# Patient Record
Sex: FEMALE | Race: WHITE | URBAN - METROPOLITAN AREA
[De-identification: names, ages, dates, MRNs, and addresses within clinical notes are randomized per-mention and may not be internally consistent; named-entity substitution may affect disease eponyms.]

---

## 2021-06-16 ENCOUNTER — OFFICE VISIT (OUTPATIENT)
Dept: URBAN - METROPOLITAN AREA CLINIC 91 | Facility: CLINIC | Age: 76
End: 2021-06-16
Payer: MEDICARE

## 2021-06-16 DIAGNOSIS — H43.811 VITREOUS DEGENERATION, RIGHT EYE: ICD-10-CM

## 2021-06-16 PROCEDURE — 99214 OFFICE O/P EST MOD 30 MIN: CPT | Performed by: OPHTHALMOLOGY

## 2021-06-16 PROCEDURE — 92250 FUNDUS PHOTOGRAPHY W/I&R: CPT | Performed by: OPHTHALMOLOGY

## 2021-06-16 ASSESSMENT — INTRAOCULAR PRESSURE
OD: 16
OS: 15

## 2021-06-16 NOTE — IMPRESSION/PLAN
Impression: Age-related nuclear cataract, right eye: H25.11. Plan: Due to the fact that cataract is not affecting patient's vision right eye, I would not recommend surgical intervention at this time. Patient was advised to consider using UVB sunglasses when outdoors. We will consider cataract surgery at later time if patient's visual function no longer meets their needs or interferes with their daily activities.

## 2021-06-16 NOTE — IMPRESSION/PLAN
Impression: Vitreous degeneration, right eye: H43.421. Plan: For posterior vitreous detachment, I have explained signs and symptoms associated with retinal tear or detachment. Patient was advised to return ASAP if they notice an increase or change in floaters, change or decrease in vision or a veil or curtain coming down over their vision. I have explained the risk of vision loss, and possible need for retinal detachment repair in detail. Patient was also provided with an informational pamphlet.

## 2021-09-29 ENCOUNTER — OFFICE VISIT (OUTPATIENT)
Dept: URBAN - METROPOLITAN AREA CLINIC 91 | Facility: CLINIC | Age: 76
End: 2021-09-29
Payer: MEDICARE

## 2021-09-29 DIAGNOSIS — H25.11 AGE-RELATED NUCLEAR CATARACT, RIGHT EYE: ICD-10-CM

## 2021-09-29 PROCEDURE — 99213 OFFICE O/P EST LOW 20 MIN: CPT | Performed by: OPHTHALMOLOGY

## 2021-09-29 PROCEDURE — 92134 CPTRZ OPH DX IMG PST SGM RTA: CPT | Performed by: OPHTHALMOLOGY

## 2021-09-29 RX ORDER — PREDNISOLONE ACETATE 10 MG/ML
1 % SUSPENSION/ DROPS OPHTHALMIC
Qty: 5 | Refills: 1 | Status: ACTIVE
Start: 2021-09-29

## 2021-09-29 RX ORDER — KETOROLAC TROMETHAMINE 5 MG/ML
0.5 % SOLUTION OPHTHALMIC
Qty: 3 | Refills: 1 | Status: ACTIVE
Start: 2021-09-29

## 2021-09-29 RX ORDER — OFLOXACIN 3 MG/ML
0.3 % SOLUTION/ DROPS OPHTHALMIC
Qty: 3 | Refills: 1 | Status: ACTIVE
Start: 2021-09-29

## 2021-09-29 ASSESSMENT — PACHYMETRY
OD: 3.55
OS: 4.61
OD: 24.39
OS: 23.70

## 2021-09-29 ASSESSMENT — VISUAL ACUITY
OS: 20/30
OD: 20/30

## 2021-09-29 ASSESSMENT — INTRAOCULAR PRESSURE
OS: 17
OD: 15

## 2021-09-29 NOTE — IMPRESSION/PLAN
Impression: Age-related nuclear cataract, right eye: H25.11. Plan: Patient has decided to proceed with cataract surgery OD, standard and or lensx discussed as possible options. Due to the fact that the cataract OD is interfering with patient's daily activities, cataract surgery was discussed as an option to improve patient's vision. I have discussed all risks and benefits associated with surgery. Patient understands that the need for cataract surgery is NOT urgent, and that surgery may be delayed if they wish. I discussed the different intra-ocular lens options available including standard monofocal IOL, Crystalens, Toric, ReSTOR Multifocal and Nanoflex blended vision. I have explained the option of patient proceeding with standard cataract surgery vs LenSx and astigmatism management. I stressed possible side effects such as halos and glare, need for glasses, contacts and further surgery such as laser vision correction or IOL exchange. I answered all patient's questions, and addressed any concerns patient may have. Patient wishes to schedule appointment for pre-operative exam at this time.

## 2021-09-29 NOTE — IMPRESSION/PLAN
Impression: Hypertensive retinopathy, bilateral: H35.033. Plan: OCTm reviewed and discussed that AV Chnages are stable and no treatment is required at this time Evidence of hypertensive retinopathy found. The patient was advised to see primary care physician to keep blood pressure under good control. Risk of sudden vision loss associated with hypertension was discussed with patient. If vision suddenly changes, patient should notify our office immediately.

## 2021-10-12 ENCOUNTER — SURGERY (OUTPATIENT)
Dept: URBAN - METROPOLITAN AREA EXTERNAL CLINIC 49 | Facility: EXTERNAL CLINIC | Age: 76
End: 2021-10-12
Payer: MEDICARE

## 2021-10-12 PROCEDURE — 66984 XCAPSL CTRC RMVL W/O ECP: CPT | Performed by: OPHTHALMOLOGY

## 2021-10-12 PROCEDURE — UPG01 UPG01: CUSTOM | Performed by: OPHTHALMOLOGY

## 2021-10-13 ENCOUNTER — POST-OPERATIVE VISIT (OUTPATIENT)
Dept: URBAN - METROPOLITAN AREA CLINIC 91 | Facility: CLINIC | Age: 76
End: 2021-10-13
Payer: MEDICARE

## 2021-10-13 DIAGNOSIS — Z96.1 PRESENCE OF INTRAOCULAR LENS: Primary | ICD-10-CM

## 2021-10-13 PROCEDURE — 99024 POSTOP FOLLOW-UP VISIT: CPT | Performed by: OPHTHALMOLOGY

## 2021-10-13 ASSESSMENT — INTRAOCULAR PRESSURE: OD: 16

## 2021-10-13 NOTE — IMPRESSION/PLAN
Impression: S/P CE/Standard IOL+lensx OD - . Presence of intraocular lens  Z96.1. Excellent post op course   Post operative instructions reviewed - Condition is improving - Cataract OD. Plan: Patient is healing well status post CE IOL OD. I stressed importance of patient avoiding rubbing the eye, staying out of swimming pools, hot tubs and saunas for 10 days. Patient should continue to wear shield at bedtime for 1 week. I explained post-op medication schedule with patient. Patient will continue with Antibiotic 3 times per day for 7 days then discontinue, Steriod 4 times per day x 7 days then decrease to 2 times per day and NSAID 1 times per day x7 days and then discontinue.

## 2021-10-20 ENCOUNTER — POST-OPERATIVE VISIT (OUTPATIENT)
Dept: URBAN - METROPOLITAN AREA CLINIC 91 | Facility: CLINIC | Age: 76
End: 2021-10-20
Payer: MEDICARE

## 2021-10-20 PROCEDURE — 99024 POSTOP FOLLOW-UP VISIT: CPT | Performed by: OPHTHALMOLOGY

## 2021-10-20 ASSESSMENT — VISUAL ACUITY
OS: 20/25
OD: 20/25

## 2021-10-20 ASSESSMENT — INTRAOCULAR PRESSURE: OD: 15

## 2021-10-20 NOTE — IMPRESSION/PLAN
Impression: S/P CE/Standard IOL OD - 8 Days. Presence of intraocular lens  Z96.1. Excellent post op course   Post operative instructions reviewed - Condition is improving - Cataract OD. Plan: Patient is healing well status post CE IOL OD. It is ok for patient to discontinue wearing protective shield at bedtime, and may also resume normal activity at this time. I discussed post-op medication schedule with patient. Patient should continue with steroid 2 times a day x3-4 weeks and then discontinue.

## 2021-11-10 ENCOUNTER — POST-OPERATIVE VISIT (OUTPATIENT)
Dept: URBAN - METROPOLITAN AREA CLINIC 91 | Facility: CLINIC | Age: 76
End: 2021-11-10
Payer: MEDICARE

## 2021-11-10 ASSESSMENT — INTRAOCULAR PRESSURE
OD: 16
OS: 16

## 2021-11-10 ASSESSMENT — VISUAL ACUITY
OD: 20/20
OS: 20/25

## 2021-11-10 NOTE — IMPRESSION/PLAN
Impression: S/P Phaco - PC IOL OD - 28 Days. Presence of intraocular lens  Z96.1. Post operative instructions reviewed - Condition is improving - Plan: Patient is healing well status post CE IOL. Patient will discontinue post operative drops unless directed by physician.

## 2022-02-09 ENCOUNTER — OFFICE VISIT (OUTPATIENT)
Dept: URBAN - METROPOLITAN AREA CLINIC 91 | Facility: CLINIC | Age: 77
End: 2022-02-09
Payer: MEDICARE

## 2022-02-09 DIAGNOSIS — H04.123 DRY EYE SYNDROME OF BILATERAL LACRIMAL GLANDS: ICD-10-CM

## 2022-02-09 DIAGNOSIS — H26.491 OTHER SECONDARY CATARACT, RIGHT EYE: ICD-10-CM

## 2022-02-09 DIAGNOSIS — H35.033 HYPERTENSIVE RETINOPATHY, BILATERAL: Primary | ICD-10-CM

## 2022-02-09 PROCEDURE — 92250 FUNDUS PHOTOGRAPHY W/I&R: CPT | Performed by: OPHTHALMOLOGY

## 2022-02-09 PROCEDURE — 99214 OFFICE O/P EST MOD 30 MIN: CPT | Performed by: OPHTHALMOLOGY

## 2022-02-09 ASSESSMENT — INTRAOCULAR PRESSURE
OD: 15
OS: 15

## 2022-02-09 NOTE — IMPRESSION/PLAN
Impression: Hypertensive retinopathy, bilateral: H35.033. Plan: FFM  reviewed and discussed stable AV changes OU Evidence of hypertensive retinopathy found. The patient was advised to see primary care physician to keep blood pressure under good control. Risk of sudden vision loss associated with hypertension was discussed with patient. If vision suddenly changes, patient should notify our office immediately.

## 2022-10-14 ENCOUNTER — OFFICE VISIT (OUTPATIENT)
Dept: URBAN - METROPOLITAN AREA CLINIC 91 | Facility: CLINIC | Age: 77
End: 2022-10-14
Payer: MEDICARE

## 2022-10-14 DIAGNOSIS — H35.033 HYPERTENSIVE RETINOPATHY, BILATERAL: ICD-10-CM

## 2022-10-14 DIAGNOSIS — H26.491 OTHER SECONDARY CATARACT, RIGHT EYE: ICD-10-CM

## 2022-10-14 DIAGNOSIS — H04.123 DRY EYE SYNDROME OF BILATERAL LACRIMAL GLANDS: ICD-10-CM

## 2022-10-14 DIAGNOSIS — H53.15 VISUAL DISTORTIONS OF SHAPE AND SIZE: Primary | ICD-10-CM

## 2022-10-14 PROCEDURE — 99213 OFFICE O/P EST LOW 20 MIN: CPT | Performed by: OPHTHALMOLOGY

## 2022-10-14 PROCEDURE — 92134 CPTRZ OPH DX IMG PST SGM RTA: CPT | Performed by: OPHTHALMOLOGY

## 2022-10-14 ASSESSMENT — INTRAOCULAR PRESSURE
OS: 15
OD: 15

## 2022-10-14 NOTE — IMPRESSION/PLAN
Impression: Hypertensive retinopathy, bilateral: H35.033. Plan: OCTm shows AV changes and ERM OS. Stable. Evidence of hypertensive retinopathy found. The patient was advised to see primary care physician to keep blood pressure under good control. Risk of sudden vision loss associated with hypertension was discussed with patient. If vision suddenly changes, patient should notify our office immediately.

## 2022-11-18 ENCOUNTER — OFFICE VISIT (OUTPATIENT)
Dept: URBAN - METROPOLITAN AREA CLINIC 91 | Facility: CLINIC | Age: 77
End: 2022-11-18
Payer: MEDICARE

## 2022-11-18 DIAGNOSIS — H43.812 VITREOUS DEGENERATION, LEFT EYE: Primary | ICD-10-CM

## 2022-11-18 PROCEDURE — 99213 OFFICE O/P EST LOW 20 MIN: CPT | Performed by: OPHTHALMOLOGY

## 2022-11-18 NOTE — IMPRESSION/PLAN
Impression: Vitreous degeneration, left eye: H43.371. Plan: For posterior vitreous detachment, I have explained signs and symptoms associated with retinal tear or detachment. Patient was advised to return ASAP if they notice an increase or change in floaters, change or decrease in vision or a veil or curtain coming down over their vision. I have explained the risk of vision loss, and possible need for retinal detachment repair in detail. Patient was also provided with an informational pamphlet.

## 2023-04-11 ENCOUNTER — OFFICE VISIT (OUTPATIENT)
Dept: URBAN - METROPOLITAN AREA CLINIC 91 | Facility: CLINIC | Age: 78
End: 2023-04-11
Payer: MEDICARE

## 2023-04-11 DIAGNOSIS — H10.022 OTHER MUCOPURULENT CONJUNCTIVITIS, LEFT EYE: ICD-10-CM

## 2023-04-11 DIAGNOSIS — H26.491 OTHER SECONDARY CATARACT, RIGHT EYE: Primary | ICD-10-CM

## 2023-04-11 PROCEDURE — 99214 OFFICE O/P EST MOD 30 MIN: CPT | Performed by: OPHTHALMOLOGY

## 2023-04-11 RX ORDER — NEOMYCIN SULFATE, POLYMYXIN B SULFATE AND DEXAMETHASONE 3.5; 10000; 1 MG/ML; [USP'U]/ML; MG/ML
SUSPENSION OPHTHALMIC
Qty: 5 | Refills: 0 | Status: ACTIVE
Start: 2023-04-11

## 2023-04-11 ASSESSMENT — VISUAL ACUITY
OS: 20/25
OD: 20/20

## 2023-04-11 ASSESSMENT — INTRAOCULAR PRESSURE
OS: 14
OD: 15

## 2023-04-11 NOTE — IMPRESSION/PLAN
Impression: Other mucopurulent conjunctivitis, left eye: H10.022. Plan: For conjunctivitis OS, I recommend cool compresses and patient to begin using a good quality artificial tear. I have explained the mechanism of transmission, and patient was educated to wash their hands and use separate towels and bedding. 

Start maxitrol TID OS

## 2023-04-20 ENCOUNTER — OFFICE VISIT (OUTPATIENT)
Dept: URBAN - METROPOLITAN AREA CLINIC 91 | Facility: CLINIC | Age: 78
End: 2023-04-20
Payer: MEDICARE

## 2023-04-20 DIAGNOSIS — H26.491 OTHER SECONDARY CATARACT, RIGHT EYE: Primary | ICD-10-CM

## 2023-04-20 DIAGNOSIS — H10.022 OTHER MUCOPURULENT CONJUNCTIVITIS, LEFT EYE: ICD-10-CM

## 2023-04-20 PROCEDURE — 99214 OFFICE O/P EST MOD 30 MIN: CPT | Performed by: OPHTHALMOLOGY

## 2023-04-20 ASSESSMENT — INTRAOCULAR PRESSURE
OS: 14
OD: 14

## 2023-11-02 ENCOUNTER — OFFICE VISIT (OUTPATIENT)
Dept: URBAN - METROPOLITAN AREA CLINIC 91 | Facility: CLINIC | Age: 78
End: 2023-11-02
Payer: MEDICARE

## 2023-11-02 DIAGNOSIS — H26.491 OTHER SECONDARY CATARACT, RIGHT EYE: ICD-10-CM

## 2023-11-02 DIAGNOSIS — H35.033 HYPERTENSIVE RETINOPATHY, BILATERAL: Primary | ICD-10-CM

## 2023-11-02 PROCEDURE — 99213 OFFICE O/P EST LOW 20 MIN: CPT | Performed by: OPHTHALMOLOGY

## 2023-11-02 PROCEDURE — 92134 CPTRZ OPH DX IMG PST SGM RTA: CPT | Performed by: OPHTHALMOLOGY

## 2023-11-02 ASSESSMENT — INTRAOCULAR PRESSURE
OS: 14
OD: 14

## 2024-05-07 ENCOUNTER — OFFICE VISIT (OUTPATIENT)
Dept: URBAN - METROPOLITAN AREA CLINIC 91 | Facility: CLINIC | Age: 79
End: 2024-05-07
Payer: MEDICARE

## 2024-05-07 DIAGNOSIS — H35.033 HYPERTENSIVE RETINOPATHY, BILATERAL: Primary | ICD-10-CM

## 2024-05-07 DIAGNOSIS — H35.361 DRUSEN OF MACULA OF RT EYE: ICD-10-CM

## 2024-05-07 DIAGNOSIS — H26.491 OTHER SECONDARY CATARACT, RIGHT EYE: ICD-10-CM

## 2024-05-07 DIAGNOSIS — H02.831 DERMATOCHALASIS OF RIGHT UPPER EYELID: ICD-10-CM

## 2024-05-07 DIAGNOSIS — H02.834 DERMATOCHALASIS OF LEFT UPPER EYELID: ICD-10-CM

## 2024-05-07 PROCEDURE — 99214 OFFICE O/P EST MOD 30 MIN: CPT | Performed by: OPHTHALMOLOGY

## 2024-05-07 PROCEDURE — 92250 FUNDUS PHOTOGRAPHY W/I&R: CPT | Performed by: OPHTHALMOLOGY

## 2024-05-07 ASSESSMENT — INTRAOCULAR PRESSURE
OD: 14
OS: 14

## 2024-11-13 ENCOUNTER — OFFICE VISIT (OUTPATIENT)
Dept: URBAN - METROPOLITAN AREA CLINIC 91 | Facility: CLINIC | Age: 79
End: 2024-11-13
Payer: MEDICARE

## 2024-11-13 DIAGNOSIS — H04.123 DRY EYE SYNDROME OF BILATERAL LACRIMAL GLANDS: ICD-10-CM

## 2024-11-13 DIAGNOSIS — H35.033 HYPERTENSIVE RETINOPATHY, BILATERAL: Primary | ICD-10-CM

## 2024-11-13 DIAGNOSIS — H26.491 OTHER SECONDARY CATARACT, RIGHT EYE: ICD-10-CM

## 2024-11-13 PROCEDURE — 99214 OFFICE O/P EST MOD 30 MIN: CPT | Performed by: OPHTHALMOLOGY

## 2024-11-13 PROCEDURE — 92134 CPTRZ OPH DX IMG PST SGM RTA: CPT | Performed by: OPHTHALMOLOGY

## 2024-11-13 ASSESSMENT — INTRAOCULAR PRESSURE
OD: 14
OS: 14

## 2025-05-14 ENCOUNTER — OFFICE VISIT (OUTPATIENT)
Facility: LOCATION | Age: 80
End: 2025-05-14
Payer: MEDICARE

## 2025-05-14 DIAGNOSIS — H26.491 OTHER SECONDARY CATARACT, RIGHT EYE: ICD-10-CM

## 2025-05-14 DIAGNOSIS — H35.033 HYPERTENSIVE RETINOPATHY, BILATERAL: ICD-10-CM

## 2025-05-14 DIAGNOSIS — H04.123 DRY EYE SYNDROME OF BILATERAL LACRIMAL GLANDS: ICD-10-CM

## 2025-05-14 DIAGNOSIS — H35.361 DRUSEN (DEGENERATIVE) OF MACULA, RIGHT EYE: Primary | ICD-10-CM

## 2025-05-14 DIAGNOSIS — H43.811 VITREOUS DEGENERATION, RIGHT EYE: ICD-10-CM

## 2025-05-14 PROCEDURE — 92134 CPTRZ OPH DX IMG PST SGM RTA: CPT | Performed by: OPHTHALMOLOGY

## 2025-05-14 PROCEDURE — 99214 OFFICE O/P EST MOD 30 MIN: CPT | Performed by: OPHTHALMOLOGY

## 2025-05-14 ASSESSMENT — VISUAL ACUITY
OD: 20/20
OS: 20/30

## 2025-05-14 ASSESSMENT — INTRAOCULAR PRESSURE
OD: 14
OS: 14